# Patient Record
Sex: MALE | Race: WHITE | Employment: STUDENT | URBAN - METROPOLITAN AREA
[De-identification: names, ages, dates, MRNs, and addresses within clinical notes are randomized per-mention and may not be internally consistent; named-entity substitution may affect disease eponyms.]

---
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
## 2025-07-02 ENCOUNTER — OFFICE VISIT (OUTPATIENT)
Dept: ENT CLINIC | Age: 20
End: 2025-07-02
Payer: COMMERCIAL

## 2025-07-02 VITALS
SYSTOLIC BLOOD PRESSURE: 119 MMHG | HEART RATE: 70 BPM | BODY MASS INDEX: 26.16 KG/M2 | DIASTOLIC BLOOD PRESSURE: 63 MMHG | HEIGHT: 75 IN | RESPIRATION RATE: 16 BRPM | WEIGHT: 210.4 LBS | OXYGEN SATURATION: 96 %

## 2025-07-02 DIAGNOSIS — J34.89 NASAL DRYNESS: Primary | ICD-10-CM

## 2025-07-02 DIAGNOSIS — R04.0 EPISTAXIS: ICD-10-CM

## 2025-07-02 PROCEDURE — 99203 OFFICE O/P NEW LOW 30 MIN: CPT

## 2025-07-02 ASSESSMENT — ENCOUNTER SYMPTOMS
EYE DISCHARGE: 0
EYE PAIN: 0
SINUS PRESSURE: 0
COUGH: 0
RHINORRHEA: 0
VOMITING: 0
ALLERGIC/IMMUNOLOGIC NEGATIVE: 1
SHORTNESS OF BREATH: 0
BACK PAIN: 0
DIARRHEA: 0
SORE THROAT: 0

## 2025-07-02 NOTE — PROGRESS NOTES
Content: Thought content normal.         Judgment: Judgment normal.         Assessment:       Diagnosis Orders   1. Nasal dryness        2. Epistaxis                   Plan:      Ady is a 20-year-old male who presents to the office today as a new patient for evaluation of recurrent epistaxis.  He reports a several year history of recurrent ear infections that last 15 to 20 minutes at a time.  Last nosebleed was approximately 5 days ago.  There was some dryness and irritation noted on the left side of the nasal septum however, no active bleeding or oozing or draining was noted.  At this time I would like him to initiate use of nasal saline spray 2 sprays each nostril 4-5 times daily as well as daily application of Preparation H to bilateral sides of the nasal septum.  He was instructed on proper use.  Regarding management of epistaxis I did discuss applying direct pressure to bilateral sides of the of the nose and holding that pressure for a complete 10 minutes.  If bleeding persist at that time he was directed to apply direct pressure for an additional 20 minutes.  If bleeding continues to persist he should follow-up with our office during regular business hours or at the emergency department after hours.  He did verbalize understanding and was in agreement to this plan.  He was instructed to call the office for any new or worsening symptoms prior to his next appointment.    Follow up in 6 week(s)      RX given today:  none    Pierre Timmons MSN, FNP-BC  [facility] - Ear, Nose and Throat    The information contained in this note has been dictated using drug and medical speech recognition software and may contain errors

## 2025-08-13 ENCOUNTER — PATIENT MESSAGE (OUTPATIENT)
Dept: PRIMARY CARE CLINIC | Age: 20
End: 2025-08-13

## 2025-08-13 RX ORDER — MOXIFLOXACIN 5 MG/ML
1 SOLUTION/ DROPS OPHTHALMIC 3 TIMES DAILY
Qty: 3 ML | Refills: 0 | Status: SHIPPED | OUTPATIENT
Start: 2025-08-13 | End: 2025-08-20